# Patient Record
Sex: MALE | Race: BLACK OR AFRICAN AMERICAN | NOT HISPANIC OR LATINO | Employment: UNEMPLOYED | URBAN - METROPOLITAN AREA
[De-identification: names, ages, dates, MRNs, and addresses within clinical notes are randomized per-mention and may not be internally consistent; named-entity substitution may affect disease eponyms.]

---

## 2023-05-01 ENCOUNTER — PREP FOR SURGERY (OUTPATIENT)
Dept: SURGERY | Facility: SURGERY CENTER | Age: 43
End: 2023-05-01
Payer: OTHER GOVERNMENT

## 2023-05-01 ENCOUNTER — OFFICE VISIT (OUTPATIENT)
Dept: GASTROENTEROLOGY | Facility: CLINIC | Age: 43
End: 2023-05-01
Payer: OTHER GOVERNMENT

## 2023-05-01 VITALS
HEIGHT: 71 IN | HEART RATE: 98 BPM | SYSTOLIC BLOOD PRESSURE: 130 MMHG | WEIGHT: 260.4 LBS | DIASTOLIC BLOOD PRESSURE: 70 MMHG | OXYGEN SATURATION: 97 % | BODY MASS INDEX: 36.46 KG/M2 | TEMPERATURE: 98 F

## 2023-05-01 DIAGNOSIS — K21.9 GASTROESOPHAGEAL REFLUX DISEASE, UNSPECIFIED WHETHER ESOPHAGITIS PRESENT: ICD-10-CM

## 2023-05-01 DIAGNOSIS — K21.9 GASTROESOPHAGEAL REFLUX DISEASE, UNSPECIFIED WHETHER ESOPHAGITIS PRESENT: Primary | ICD-10-CM

## 2023-05-01 DIAGNOSIS — K62.5 RECTAL BLEEDING: Primary | ICD-10-CM

## 2023-05-01 DIAGNOSIS — Z12.11 COLON CANCER SCREENING: ICD-10-CM

## 2023-05-01 DIAGNOSIS — R19.4 CHANGE IN BOWEL HABITS: ICD-10-CM

## 2023-05-01 DIAGNOSIS — K62.5 RECTAL BLEEDING: ICD-10-CM

## 2023-05-01 PROCEDURE — 99204 OFFICE O/P NEW MOD 45 MIN: CPT

## 2023-05-01 RX ORDER — SODIUM CHLORIDE 0.9 % (FLUSH) 0.9 %
3 SYRINGE (ML) INJECTION EVERY 12 HOURS SCHEDULED
OUTPATIENT
Start: 2023-05-01

## 2023-05-01 RX ORDER — OMEPRAZOLE 40 MG/1
40 CAPSULE, DELAYED RELEASE ORAL DAILY
COMMUNITY

## 2023-05-01 RX ORDER — SODIUM CHLORIDE 0.9 % (FLUSH) 0.9 %
10 SYRINGE (ML) INJECTION AS NEEDED
OUTPATIENT
Start: 2023-05-01

## 2023-05-01 RX ORDER — SODIUM CHLORIDE, SODIUM LACTATE, POTASSIUM CHLORIDE, CALCIUM CHLORIDE 600; 310; 30; 20 MG/100ML; MG/100ML; MG/100ML; MG/100ML
30 INJECTION, SOLUTION INTRAVENOUS CONTINUOUS PRN
OUTPATIENT
Start: 2023-05-01

## 2023-05-01 RX ORDER — ESCITALOPRAM OXALATE 20 MG/1
TABLET ORAL
COMMUNITY
Start: 2023-01-14

## 2023-05-01 RX ORDER — ATOMOXETINE 25 MG/1
25 CAPSULE ORAL DAILY
COMMUNITY

## 2023-05-01 RX ORDER — OLANZAPINE 15 MG/1
15 TABLET ORAL NIGHTLY
COMMUNITY

## 2023-05-01 RX ORDER — MISOPROSTOL 100 UG/1
100 TABLET ORAL 4 TIMES DAILY
COMMUNITY

## 2023-05-01 NOTE — PATIENT INSTRUCTIONS
Schedule EGD and colonoscopy, orders placed.    Follow-up visit after colonoscopy to review findings and make additional recommendations if needed.     Until endoscopic evaluation, continue omeprazole 40 mg twice daily prior to breakfast and dinner as prescribed    We recommend starting a daily fiber supplement such as FiberCon     These can be purchased over-the-counter, generic brand is fine.  Fiber supplements can take 12 to 72 hours to start working. Make sure to drink 6 to 12 ounces of water or noncarbonated beverage with fiber supplement.     Recommended starting with 1 tablet daily for 7 days, followed by increasing to 2 tablets daily for 7 days, if no improvement in bowel habits can continue with gradual increase by 1 additional tablet every seven days with a max of 4 tablets daily.  To help reduce risk of abdominal bloating/gas and allow your body to acclimate to fiber diet intake.  If you do not experience any of these adverse effects may increase to daily dose listed on product.      For constipation:    We recommend starting MiraLAX. This is available over-the-counter and generic brand is fine.    MiraLAX works best when taken on a regular basis (daily or every other day) to help promote more regular bowel movements.    Typically patients start with 1/2 -1 capful mixed in 8 ounces of noncarbonated liquid and take once or twice daily.    Maximum is 2 full capfuls daily.  Adjust dose based on your response.

## 2023-05-01 NOTE — PROGRESS NOTES
"Chief Complaint   Patient presents with   • Rectal Bleeding           History of Present Illness    Patient is a 43 y.o. who presents to the office as a new patient for further evaluation of possible left-sided colitis and rectal bleeding after receiving [facility]. Patient has a significant past medical history of GERD, anemia, and rectal bleeding.    Denies prior colonoscopy evaluation.    For GERD, he continues on omeprazole 40 mg twice daily before breakfast and dinner.  Denies upper GI concerns including heartburn, nausea, vomiting, or dysphagia.     Bowel habits are described as occurring 2-3 times a day with sensation of incomplete evacuation of stool.  Describes rectal bleeding as occurring multiple times a month however less than once per week without ability to provide numerical value to frequency.      States that the symptoms are often preceded by rectal irritation and pruritus. rectal exam was completed with [facility] within the recent weeks without reporting hemorrhoids visible on exam.  Bowel habits are described as occurring 1-2 times per day with sensation of incomplete evacuation of stool.  Denies fiber supplementation at this time.    He will occasionally experience right lower quadrant abdominal pain described as a cramping sensation prior to defecation.  It is nonradiating in nature.  Denies fever or chills.  No unintentional weight loss.    Denies use of NSAIDs, alcohol, or tobacco.    Patient denies known family history of colon polyps, colon cancer, or IBD.       Result Review :       Referral to Silas Dewitt MD for Left sided colitis with rectal bleeding (04/25/2023)        Vital Signs:   /70   Pulse 98   Temp 98 °F (36.7 °C)   Ht 180.3 cm (71\")   Wt 118 kg (260 lb 6.4 oz)   SpO2 97%   BMI 36.32 kg/m²     Body mass index is 36.32 kg/m².     Physical Exam  Vitals reviewed.   Constitutional:       General: He is not in acute " distress.     Appearance: He is well-developed. He is not diaphoretic.   HENT:      Head: Normocephalic and atraumatic.   Eyes:      General: No scleral icterus.  Cardiovascular:      Rate and Rhythm: Normal rate and regular rhythm.   Pulmonary:      Effort: Pulmonary effort is normal.      Breath sounds: Normal breath sounds.   Abdominal:      General: Abdomen is flat. Bowel sounds are normal. There is no distension.      Palpations: Abdomen is soft. There is no mass.      Tenderness: There is no abdominal tenderness. There is no guarding or rebound.      Hernia: No hernia is present.   Skin:     General: Skin is warm and dry.   Neurological:      Mental Status: He is alert and oriented to person, place, and time.   Psychiatric:         Judgment: Judgment normal.       Rectal exam offered at time of today's visit and declined by patient stating he would like to have performed at time of pending colonoscopy evaluation.    Assessment and Plan    Diagnoses and all orders for this visit:    1. Rectal bleeding (Primary)    2. Change in bowel habits    3. Colon cancer screening    4. Gastroesophageal reflux disease, unspecified whether esophagitis present           Discussion:    Patient presents today as a new patient for further evaluation of rectal bleeding.  Reviewed previous records obtained from [facility].   For GERD, recommend proceeding with EGD evaluation secondary to history of heartburn symptoms as well as on high-dose PPI therapy to screen for Matta's esophagus, esophagitis, gastritis, H. pylori, and celiac disease.  While work-up is being completed we will continue on omeprazole 40 mg twice daily as prescribed.    Recommend proceeding with colonoscopy evaluation to assess for hemorrhoids, proctitis, colitis, polyps, or malignancy contributing to rectal bleeding.    While awaiting colonoscopy evaluation recommend starting daily fiber supplementation including FiberCon to help promote complete soft  formed bowel movements and reduce irritation to possible hemorrhoids.  Encouraged patient to follow-up in our office 2 to 4 weeks after undergoing colonoscopy evaluation with  Further recommendations to be made pending results of the above work-up and clinical course.    If colonoscopy is unremarkable for cause to rectal bleeding to consider performing CT imaging of the abdomen pelvis to assess for alternative cause to right lower quadrant abdominal pain and or rectal bleeding.    Patient is agreeable to the outlined above treatment plan.  Verbalizes understanding and will contact office for any new or worsening concerns.  All questions answered and support provided.        Patient Instructions   Schedule EGD and colonoscopy, orders placed.    Follow-up visit after colonoscopy to review findings and make additional recommendations if needed.     Until endoscopic evaluation, continue omeprazole 40 mg twice daily prior to breakfast and dinner as prescribed    We recommend starting a daily fiber supplement such as FiberCon     These can be purchased over-the-counter, generic brand is fine.  Fiber supplements can take 12 to 72 hours to start working. Make sure to drink 6 to 12 ounces of water or noncarbonated beverage with fiber supplement.     Recommended starting with 1 tablet daily for 7 days, followed by increasing to 2 tablets daily for 7 days, if no improvement in bowel habits can continue with gradual increase by 1 additional tablet every seven days with a max of 4 tablets daily.  To help reduce risk of abdominal bloating/gas and allow your body to acclimate to fiber diet intake.  If you do not experience any of these adverse effects may increase to daily dose listed on product.      For constipation:    We recommend starting MiraLAX. This is available over-the-counter and generic brand is fine.    MiraLAX works best when taken on a regular basis (daily or every other day) to help promote more regular bowel  movements.    Typically patients start with 1/2 -1 capful mixed in 8 ounces of noncarbonated liquid and take once or twice daily.    Maximum is 2 full capfuls daily.  Adjust dose based on your response.              EMR Dragon/Transcription Disclaimer:  This document has been Dictated utilizing Dragon dictation.

## 2023-05-09 NOTE — SIGNIFICANT NOTE
Education provided the Patient on the following:    - Nothing to Eat or Drink after MN the night before the procedure    - Avoid red/purple fluids while completing their bowel prep as ordered by physician  -Contact Gastrointerologist office for any questions about specific details regarding colon prep    -You will need to have someone drive you home after your colonoscopy and remain with you for 24 hours after the procedure  - The date of your procedure, your are welcome to have one visitor at bedside or remain within 10-15 minutes of [facility]  -Please wear warm socks when you arrive for your procedure  -Remove all jewelry and leave any valuables before arriving the day of your procedure (all will have to be removed before leaving preop)  -You will need to arrive at 1400 on  5/11/23 procedure    -Feel free to contact us at: [phone] with any additional questions/concerns

## 2023-05-11 ENCOUNTER — TELEPHONE (OUTPATIENT)
Dept: GASTROENTEROLOGY | Facility: CLINIC | Age: 43
End: 2023-05-11
Payer: OTHER GOVERNMENT

## 2023-05-11 NOTE — TELEPHONE ENCOUNTER
Pt called and would like to reschedule his procedures due to the fact that he ate dairy yesterday and today. He thought it was OK to eat dairy. Please advise.

## 2023-05-18 ENCOUNTER — ANESTHESIA EVENT (OUTPATIENT)
Dept: SURGERY | Facility: SURGERY CENTER | Age: 43
End: 2023-05-18
Payer: OTHER GOVERNMENT

## 2023-05-18 ENCOUNTER — ANESTHESIA (OUTPATIENT)
Dept: SURGERY | Facility: SURGERY CENTER | Age: 43
End: 2023-05-18
Payer: OTHER GOVERNMENT

## 2023-05-18 ENCOUNTER — HOSPITAL ENCOUNTER (OUTPATIENT)
Facility: SURGERY CENTER | Age: 43
Setting detail: HOSPITAL OUTPATIENT SURGERY
Discharge: HOME | End: 2023-05-18
Attending: INTERNAL MEDICINE | Admitting: INTERNAL MEDICINE
Payer: OTHER GOVERNMENT

## 2023-05-18 VITALS
WEIGHT: 255.6 LBS | BODY MASS INDEX: 35.78 KG/M2 | RESPIRATION RATE: 18 BRPM | SYSTOLIC BLOOD PRESSURE: 128 MMHG | DIASTOLIC BLOOD PRESSURE: 94 MMHG | OXYGEN SATURATION: 93 % | HEIGHT: 71 IN | TEMPERATURE: 98.4 F | HEART RATE: 99 BPM

## 2023-05-18 DIAGNOSIS — Z12.11 COLON CANCER SCREENING: ICD-10-CM

## 2023-05-18 DIAGNOSIS — K62.5 RECTAL BLEEDING: ICD-10-CM

## 2023-05-18 DIAGNOSIS — K21.9 GASTROESOPHAGEAL REFLUX DISEASE, UNSPECIFIED WHETHER ESOPHAGITIS PRESENT: ICD-10-CM

## 2023-05-18 DIAGNOSIS — R19.4 CHANGE IN BOWEL HABITS: ICD-10-CM

## 2023-05-18 PROCEDURE — 45385 COLONOSCOPY W/LESION REMOVAL: CPT | Performed by: INTERNAL MEDICINE

## 2023-05-18 PROCEDURE — 88305 TISSUE EXAM BY PATHOLOGIST: CPT | Performed by: INTERNAL MEDICINE

## 2023-05-18 PROCEDURE — 43239 EGD BIOPSY SINGLE/MULTIPLE: CPT | Performed by: INTERNAL MEDICINE

## 2023-05-18 PROCEDURE — 25010000002 PROPOFOL 10 MG/ML EMULSION: Performed by: STUDENT IN AN ORGANIZED HEALTH CARE EDUCATION/TRAINING PROGRAM

## 2023-05-18 PROCEDURE — 45380 COLONOSCOPY AND BIOPSY: CPT | Performed by: INTERNAL MEDICINE

## 2023-05-18 RX ORDER — LIDOCAINE HYDROCHLORIDE 20 MG/ML
INJECTION, SOLUTION INFILTRATION; PERINEURAL AS NEEDED
Status: DISCONTINUED | OUTPATIENT
Start: 2023-05-18 | End: 2023-05-18 | Stop reason: SURG

## 2023-05-18 RX ORDER — SODIUM CHLORIDE 0.9 % (FLUSH) 0.9 %
3 SYRINGE (ML) INJECTION EVERY 12 HOURS SCHEDULED
Status: DISCONTINUED | OUTPATIENT
Start: 2023-05-18 | End: 2023-05-18 | Stop reason: HOSPADM

## 2023-05-18 RX ORDER — PROPOFOL 10 MG/ML
VIAL (ML) INTRAVENOUS AS NEEDED
Status: DISCONTINUED | OUTPATIENT
Start: 2023-05-18 | End: 2023-05-18 | Stop reason: SURG

## 2023-05-18 RX ORDER — SODIUM CHLORIDE, SODIUM LACTATE, POTASSIUM CHLORIDE, CALCIUM CHLORIDE 600; 310; 30; 20 MG/100ML; MG/100ML; MG/100ML; MG/100ML
30 INJECTION, SOLUTION INTRAVENOUS CONTINUOUS PRN
Status: DISCONTINUED | OUTPATIENT
Start: 2023-05-18 | End: 2023-05-18 | Stop reason: HOSPADM

## 2023-05-18 RX ORDER — SODIUM CHLORIDE 0.9 % (FLUSH) 0.9 %
10 SYRINGE (ML) INJECTION AS NEEDED
Status: DISCONTINUED | OUTPATIENT
Start: 2023-05-18 | End: 2023-05-18 | Stop reason: HOSPADM

## 2023-05-18 RX ADMIN — LIDOCAINE HYDROCHLORIDE 40 MG: 20 INJECTION, SOLUTION INFILTRATION; PERINEURAL at 14:18

## 2023-05-18 RX ADMIN — PROPOFOL 200 MG: 10 INJECTION, EMULSION INTRAVENOUS at 14:18

## 2023-05-18 RX ADMIN — PROPOFOL 200 MCG/KG/MIN: 10 INJECTION, EMULSION INTRAVENOUS at 14:18

## 2023-05-18 RX ADMIN — SODIUM CHLORIDE, POTASSIUM CHLORIDE, SODIUM LACTATE AND CALCIUM CHLORIDE 30 ML/HR: 600; 310; 30; 20 INJECTION, SOLUTION INTRAVENOUS at 14:12

## 2023-05-18 NOTE — ANESTHESIA PREPROCEDURE EVALUATION
Anesthesia Evaluation     Patient summary reviewed and Nursing notes reviewed                Airway   Mallampati: II  TM distance: >3 FB  Neck ROM: full  Dental    (+) poor dentition    Pulmonary - negative pulmonary ROS   Cardiovascular - negative cardio ROS        Neuro/Psych  (+) psychiatric history Anxiety and Depression,    GI/Hepatic/Renal/Endo    (+) obesity,  GERD, PUD,      Musculoskeletal     Abdominal    Substance History      OB/GYN          Other                        Anesthesia Plan    ASA 2     MAC   total IV anesthesia  (I have reviewed the patient's history with the patient and the chart, including all pertinent laboratory results and imaging. I have explained the risks of anesthesia including but not limited to dental damage, corneal abrasion, nerve injury, MI, stroke, and death. Questions asked and answered. Anesthetic plan discussed with patient and team as indicated. Patient expressed understanding of the above.  )    Anesthetic plan, risks, benefits, and alternatives have been provided, discussed and informed consent has been obtained with: patient.        CODE STATUS:

## 2023-05-18 NOTE — ANESTHESIA POSTPROCEDURE EVALUATION
"Patient: Norma Licea    Procedure Summary     Date: 05/18/23 Room / Location: [facility]    Anesthesia Start: 1414 Anesthesia Stop: 1444    Procedures:       ESOPHAGOGASTRODUODENOSCOPY      COLONOSCOPY FOR SCREENING with Polypectomy Diagnosis:       Gastroesophageal reflux disease, unspecified whether esophagitis present      Colon cancer screening      Rectal bleeding      Change in bowel habits      (Gastroesophageal reflux disease, unspecified whether esophagitis present [K21.9])      (Colon cancer screening [Z12.11])      (Rectal bleeding [K62.5])      (Change in bowel habits [R19.4])    Surgeons: Srini Grossman MD Provider: Bassem Ly MD    Anesthesia Type: MAC ASA Status: 2          Anesthesia Type: MAC    Vitals  Vitals Value Taken Time   /85 05/18/23 1444   Temp 36.9 °C (98.4 °F) 05/18/23 1444   Pulse 112 05/18/23 1444   Resp 18 05/18/23 1444   SpO2 93 % 05/18/23 1444           Post Anesthesia Care and Evaluation    Patient location during evaluation: PACU  Patient participation: complete - patient participated  Level of consciousness: awake and alert  Pain management: adequate    Airway patency: patent  Anesthetic complications: No anesthetic complications  PONV Status: controlled  Cardiovascular status: acceptable and hemodynamically stable  Respiratory status: acceptable  Hydration status: acceptable    Comments: /85 (BP Location: Left arm, Patient Position: Lying)   Pulse 112   Temp 36.9 °C (98.4 °F) (Tympanic)   Resp 18   Ht 180.3 cm (71\")   Wt 116 kg (255 lb 9.6 oz)   SpO2 93%   BMI 35.65 kg/m²       "

## 2023-05-22 LAB
LAB AP CASE REPORT: NORMAL
LAB AP DIAGNOSIS COMMENT: NORMAL
PATH REPORT.FINAL DX SPEC: NORMAL
PATH REPORT.GROSS SPEC: NORMAL

## 2023-11-27 ENCOUNTER — TELEPHONE (OUTPATIENT)
Dept: PAIN MEDICINE | Facility: CLINIC | Age: 43
End: 2023-11-27
Payer: OTHER GOVERNMENT

## 2023-11-27 NOTE — TELEPHONE ENCOUNTER
No voicemail. Calling patient to see if he would like to switch his appt tomorrow from Dr Boucher to an APRN (Dr German), since Dr Boucher is going on maternity leave.

## (undated) DEVICE — LASSO POLYPECTOMY SNARE: Brand: LASSO

## (undated) DEVICE — GOWN ,SIRUS,NONREINFORCED 3XL: Brand: MEDLINE

## (undated) DEVICE — CANN NASL CO2 TRULINK W/O2 A/

## (undated) DEVICE — THE SINGLE USE ETRAP – POLYP TRAP IS USED FOR SUCTION RETRIEVAL OF ENDOSCOPICALLY REMOVED POLYPS.: Brand: ETRAP

## (undated) DEVICE — GOWN ISOL W/THUMB UNIV BLU BX/15

## (undated) DEVICE — BITEBLOCK OMNI BLOC

## (undated) DEVICE — KT ORCA ORCAPOD DISP STRL

## (undated) DEVICE — VIAL FORMLN CAP 10PCT 20ML

## (undated) DEVICE — FLEX ADVANTAGE 1500CC: Brand: FLEX ADVANTAGE

## (undated) DEVICE — SINGLE-USE BIOPSY FORCEPS: Brand: RADIAL JAW 4

## (undated) DEVICE — MSK ENDO PORT O2 POM ELITE CURAPLEX A/

## (undated) DEVICE — SYRINGE, LUER SLIP, STERILE, 60ML: Brand: MEDLINE

## (undated) DEVICE — Device